# Patient Record
Sex: FEMALE | Race: WHITE | NOT HISPANIC OR LATINO | Employment: STUDENT | URBAN - METROPOLITAN AREA
[De-identification: names, ages, dates, MRNs, and addresses within clinical notes are randomized per-mention and may not be internally consistent; named-entity substitution may affect disease eponyms.]

---
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
## 2020-02-23 ENCOUNTER — OFFICE VISIT (OUTPATIENT)
Dept: URGENT CARE | Age: 20
End: 2020-02-23
Payer: COMMERCIAL

## 2020-02-23 VITALS
RESPIRATION RATE: 20 BRPM | BODY MASS INDEX: 19.42 KG/M2 | HEART RATE: 98 BPM | WEIGHT: 113.78 LBS | OXYGEN SATURATION: 100 % | HEIGHT: 64 IN | DIASTOLIC BLOOD PRESSURE: 81 MMHG | SYSTOLIC BLOOD PRESSURE: 121 MMHG | TEMPERATURE: 98.2 F

## 2020-02-23 DIAGNOSIS — J20.9 ACUTE BRONCHITIS, UNSPECIFIED ORGANISM: Primary | ICD-10-CM

## 2020-02-23 DIAGNOSIS — R07.89 CHEST TIGHTNESS: ICD-10-CM

## 2020-02-23 DIAGNOSIS — J02.9 SORE THROAT: ICD-10-CM

## 2020-02-23 LAB — S PYO AG THROAT QL: NEGATIVE

## 2020-02-23 PROCEDURE — 87147 CULTURE TYPE IMMUNOLOGIC: CPT | Performed by: PHYSICIAN ASSISTANT

## 2020-02-23 PROCEDURE — 94640 AIRWAY INHALATION TREATMENT: CPT | Performed by: PHYSICIAN ASSISTANT

## 2020-02-23 PROCEDURE — 99213 OFFICE O/P EST LOW 20 MIN: CPT | Performed by: PHYSICIAN ASSISTANT

## 2020-02-23 PROCEDURE — 87070 CULTURE OTHR SPECIMN AEROBIC: CPT | Performed by: PHYSICIAN ASSISTANT

## 2020-02-23 RX ORDER — ALBUTEROL SULFATE 90 UG/1
2 AEROSOL, METERED RESPIRATORY (INHALATION) EVERY 6 HOURS PRN
Qty: 18 G | Refills: 0 | Status: SHIPPED | OUTPATIENT
Start: 2020-02-23

## 2020-02-23 RX ORDER — ALBUTEROL SULFATE 2.5 MG/3ML
2.5 SOLUTION RESPIRATORY (INHALATION) ONCE
Status: COMPLETED | OUTPATIENT
Start: 2020-02-23 | End: 2020-02-23

## 2020-02-23 RX ORDER — FLUOXETINE 10 MG/1
CAPSULE ORAL
COMMUNITY
Start: 2020-01-30

## 2020-02-23 RX ORDER — METHYLPREDNISOLONE 4 MG/1
TABLET ORAL
Qty: 21 TABLET | Refills: 0 | Status: SHIPPED | OUTPATIENT
Start: 2020-02-23

## 2020-02-23 RX ORDER — DROSPIRENONE AND ETHINYL ESTRADIOL 0.02-3(28)
1 KIT ORAL DAILY
COMMUNITY
Start: 2020-01-23 | End: 2021-01-22

## 2020-02-23 RX ADMIN — ALBUTEROL SULFATE 2.5 MG: 2.5 SOLUTION RESPIRATORY (INHALATION) at 08:52

## 2020-02-23 NOTE — LETTER
February 23, 2020     Patient: Anuel Meade   YOB: 2000   Date of Visit: 2/23/2020       To Whom it May Concern:    Anuel Meade was seen in my clinic on 2/23/2020  She may return to work on 2/24/2020  If you have any questions or concerns, please don't hesitate to call           Sincerely,          Petra Gongora PA-C        CC: No Recipients

## 2020-02-23 NOTE — PROGRESS NOTES
[facility]      NAME: Helena Dawson is a 23 y o  female  : 2000    MRN: [id number]  DATE: 2020  TIME: 9:00 AM    Assessment and Plan   Acute bronchitis, unspecified organism [J20 9]  1  Acute bronchitis, unspecified organism  methylPREDNISolone 4 MG tablet therapy pack    albuterol (Ventolin HFA) 90 mcg/act inhaler   2  Sore throat  POCT rapid strepA    Throat culture   3  Chest tightness  albuterol inhalation solution 2 5 mg     rapid strep negative    Albuterol nebulized treatment administered in the office  Patient tolerated well  Patient has subjective and objective reduction in symptoms  O2 saturation 100% with a pulse of 101 upon discharge  Patient Instructions     Follow up with PCP in 3-5 days  Proceed to  ER if symptoms worsen  Patient placed on steroids, albuterol as needed  Continue with symptomatic treatment  Patient will begin flonase as needed for nasal congestion  Patient will begin mucinex as directed  Tylenol as needed for fever of chills   Warm salt gargles as needed for sore throat     Chief Complaint     Chief Complaint   Patient presents with    Cough     pt has a cough, sore throat and headache x3 days denies any fever or chills  History of Present Illness       Patient is 70-year-old female presenting the office for sore throat, chest congestion, cough  Patient states that symptoms ongoing past 3-4 days in duration  Patient states her symptoms began as a mild nonproductive cough and since progressed to nasal congestion, headache, sore throat, productive cough  Patient denies any fevers any chills  Patient denies any problem her eyes does admit to having nasal congestion and ear pressure  Patient states that her throat pain is symmetric in nature  Patient states that she does experience shortness of breath chest tightness associated with activity is currently short of breath    Patient states that this began soon after the initiation of her upper respiratory symptoms  Patient denies any prior history of shortness of breath     Patient states that her cough is mildly productive producing white/green sputum  Patient denies any nausea vomiting diarrhea or abdominal pain  Patient denies any chest pain or neck pain, numbness tingling the arms  Patient states she has been taking Tylenol minimal relief of symptoms  Patient offers no other complaints at this time  Cough   This is a new problem  The current episode started in the past 7 days  The problem has been gradually worsening  The problem occurs every few minutes  The cough is productive of sputum  Associated symptoms include ear congestion, nasal congestion, a sore throat and shortness of breath  Pertinent negatives include no chest pain, chills, ear pain, fever, headaches, heartburn, hemoptysis, myalgias, postnasal drip, rash, rhinorrhea, sweats, weight loss or wheezing  Treatments tried: tylenol  The treatment provided no relief  There is no history of asthma, bronchiectasis, bronchitis, COPD, emphysema, environmental allergies or pneumonia  Review of Systems   Review of Systems   Constitutional: Negative  Negative for chills, fever and weight loss  HENT: Positive for congestion and sore throat  Negative for dental problem, drooling, ear discharge, ear pain, facial swelling, hearing loss, mouth sores, nosebleeds, postnasal drip, rhinorrhea, sinus pressure, sinus pain, sneezing, tinnitus, trouble swallowing and voice change  Eyes: Negative  Respiratory: Positive for cough and shortness of breath  Negative for apnea, hemoptysis, choking, chest tightness, wheezing and stridor  Cardiovascular: Negative  Negative for chest pain  Gastrointestinal: Negative  Negative for heartburn  Endocrine: Negative  Genitourinary: Negative  Musculoskeletal: Negative  Negative for myalgias  Skin: Negative  Negative for rash  Allergic/Immunologic: Negative    Negative for environmental allergies  Neurological: Negative  Negative for headaches  Hematological: Negative  Psychiatric/Behavioral: Negative  Current Medications       Current Outpatient Medications:     drospirenone-ethinyl estradiol (JACKIE) 3-0 02 MG per tablet, Take 1 tablet by mouth daily, Disp: , Rfl:     FLUoxetine (PROzac) 10 mg capsule, 1 tab po qday for 2 week, please advance to 2 tab daily afterward , Disp: , Rfl:     albuterol (Ventolin HFA) 90 mcg/act inhaler, Inhale 2 puffs every 6 (six) hours as needed for wheezing or shortness of breath, Disp: 18 g, Rfl: 0    methylPREDNISolone 4 MG tablet therapy pack, Use as directed on package, Disp: 21 tablet, Rfl: 0    norgestimate-ethinyl estradiol (ORTHO TRI-CYCLEN,TRINESSA) 0 18/0 215/0 25 MG-35 MCG per tablet, Take 1 tablet by mouth daily, Disp: , Rfl:     TRI FEMYNOR 0 18/0 215/0 25 MG-35 MCG per tablet, Take 1 tablet by mouth daily, Disp: , Rfl: 4  No current facility-administered medications for this visit  Current Allergies     Allergies as of 02/23/2020    (No Known Allergies)            The following portions of the patient's history were reviewed and updated as appropriate: allergies, current medications, past family history, past medical history, past social history, past surgical history and problem list      Past Medical History:   Diagnosis Date    Anxiety     Depression        Past Surgical History:   Procedure Laterality Date    MOUTH SURGERY         Family History   Problem Relation Age of Onset    No Known Problems Mother     No Known Problems Father          Medications have been verified  Objective   /81   Pulse 98   Temp 98 2 °F (36 8 °C) (Tympanic)   Resp 20   Ht 5' 4" (1 626 m)   Wt 51 6 kg (113 lb 12 5 oz)   SpO2 100%   BMI 19 53 kg/m²        Physical Exam     Physical Exam   Constitutional: She is oriented to person, place, and time  She appears well-developed and well-nourished     HENT:   Head: Normocephalic  Right Ear: Hearing, tympanic membrane, external ear and ear canal normal    Left Ear: Hearing, tympanic membrane, external ear and ear canal normal    Nose: Nose normal    Mouth/Throat: Uvula is midline and mucous membranes are normal  No oropharyngeal exudate, posterior oropharyngeal edema, posterior oropharyngeal erythema or tonsillar abscesses  Tonsils are 1+ on the right  Tonsils are 1+ on the left  No tonsillar exudate  Eyes: Pupils are equal, round, and reactive to light  Conjunctivae and EOM are normal    Neck: Normal range of motion  Cardiovascular: Normal rate, regular rhythm, normal heart sounds and intact distal pulses  Pulmonary/Chest: Effort normal and breath sounds normal    Abdominal: Soft  Bowel sounds are normal    Neurological: She is alert and oriented to person, place, and time  Skin: Skin is warm  Capillary refill takes less than 2 seconds  Psychiatric: She has a normal mood and affect  Her behavior is normal  Judgment and thought content normal    Nursing note and vitals reviewed

## 2020-02-23 NOTE — PATIENT INSTRUCTIONS
Follow up with PCP in 3-5 days  Proceed to  ER if symptoms worsen  Patient placed on steroids, albuterol as needed  Continue with symptomatic treatment  Patient will begin flonase as needed for nasal congestion  Patient will begin mucinex as directed  Tylenol as needed for fever of chills   Warm salt gargles as needed for sore throat   Discussed possibility of alternative therapy in the future symptoms continue  Acute Bronchitis   WHAT YOU NEED TO KNOW:   Acute bronchitis is swelling and irritation in the air passages of your lungs  This irritation may cause you to cough or have other breathing problems  Acute bronchitis often starts because of another illness, such as a cold or the flu  The illness spreads from your nose and throat to your windpipe and airways  Bronchitis is often called a chest cold  Acute bronchitis lasts about 3 to 6 weeks and is usually not a serious illness  Your cough can last for several weeks  DISCHARGE INSTRUCTIONS:   Return to the emergency department if:   · You cough up blood  · Your lips or fingernails turn blue  · You feel like you are not getting enough air when you breathe  Contact your healthcare provider if:   · You have a fever  · Your breathing problems do not go away or get worse  · Your cough does not get better within 4 weeks  · You have questions or concerns about your condition or care  Self-care:   · Get more rest   Rest helps your body to heal  Slowly start to do more each day  Rest when you feel it is needed  · Avoid irritants in the air  Avoid chemicals, fumes, and dust  Wear a face mask if you must work around dust or fumes  Stay inside on days when air pollution levels are high  If you have allergies, stay inside when pollen counts are high  Do not use aerosol products, such as spray-on deodorant, bug spray, and hair spray  · Do not smoke or be around others who smoke    Nicotine and other chemicals in cigarettes and cigars damages the cilia that move mucus out of your lungs  Ask your healthcare provider for information if you currently smoke and need help to quit  E-cigarettes or smokeless tobacco still contain nicotine  Talk to your healthcare provider before you use these products  · Drink liquids as directed  Liquids help keep your air passages moist and help you cough up mucus  You may need to drink more liquids when you have acute bronchitis  Ask how much liquid to drink each day and which liquids are best for you  · Use a humidifier or vaporizer  Use a cool mist humidifier or a vaporizer to increase air moisture in your home  This may make it easier for you to breathe and help decrease your cough  Decrease risk for acute bronchitis:   · Get the vaccinations you need  Ask your healthcare provider if you should get vaccinated against the flu or pneumonia  · Prevent the spread of germs  You can decrease your risk of acute bronchitis and other illnesses by doing the following:     [facility] your hands often with soap and water  Carry germ-killing hand lotion or gel with you  You can use the lotion or gel to clean your hands when soap and water are not available  ¨ Do not touch your eyes, nose, or mouth unless you have washed your hands first     ¨ Always cover your mouth when you cough to prevent the spread of germs  It is best to cough into a tissue or your shirt sleeve instead of into your hand  Ask those around you cover their mouths when they cough  ¨ Try to avoid people who have a cold or the flu  If you are sick, stay away from others as much as possible  Medicines: Your healthcare provider may  give you any of the following:  · Ibuprofen or acetaminophen  are medicines that help lower your fever  They are available without a doctor's order  Ask your healthcare provider which medicine is right for you  Ask how much to take and how often to take it  Follow directions   These medicines can cause stomach bleeding if not taken correctly  Ibuprofen can cause kidney damage  Do not take ibuprofen if you have kidney disease, an ulcer, or allergies to aspirin  Acetaminophen can cause liver damage  Do not take more than 4,000 milligrams in 24 hours  · Decongestants  help loosen mucus in your lungs and make it easier to cough up  This can help you breathe easier  · Cough suppressants  decrease your urge to cough  If your cough produces mucus, do not take a cough suppressant unless your healthcare provider tells you to  Your healthcare provider may suggest that you take a cough suppressant at night so you can rest     · Inhalers  may be given  Your healthcare provider may give you one or more inhalers to help you breathe easier and cough less  An inhaler gives your medicine to open your airways  Ask your healthcare provider to show you how to use your inhaler correctly  · Take your medicine as directed  Contact your healthcare provider if you think your medicine is not helping or if you have side effects  Tell him of her if you are allergic to any medicine  Keep a list of the medicines, vitamins, and herbs you take  Include the amounts, and when and why you take them  Bring the list or the pill bottles to follow-up visits  Carry your medicine list with you in case of an emergency  Follow up with your healthcare provider as directed:  Write down questions you have so you will remember to ask them during your follow-up visits  © 2017 [employer] Information is for End User's use only and may not be sold, redistributed or otherwise used for commercial purposes  All illustrations and images included in CareNotes® are the copyrighted property of [employer]  or [employer]  The above information is an  only  It is not intended as medical advice for individual conditions or treatments   Talk to your doctor, nurse or pharmacist before following any medical regimen to see if it is safe and effective for you

## 2020-02-27 ENCOUNTER — TELEPHONE (OUTPATIENT)
Dept: URGENT CARE | Age: 20
End: 2020-02-27

## 2020-02-27 LAB — BACTERIA THROAT CULT: ABNORMAL

## 2020-02-27 NOTE — TELEPHONE ENCOUNTER
Left message for patient  Patient tested positive for strep  Will place patient on antibiotics at this time

## 2020-02-28 ENCOUNTER — TELEPHONE (OUTPATIENT)
Dept: URGENT CARE | Age: 20
End: 2020-02-28

## 2020-02-28 NOTE — TELEPHONE ENCOUNTER
Spoke and left message for patient's mother   Advised patient to return phone call  Patient tested positive for strep    Will place on antibiotics pending symptoms

## 2020-03-01 ENCOUNTER — TELEPHONE (OUTPATIENT)
Dept: URGENT CARE | Age: 20
End: 2020-03-01

## 2020-03-01 NOTE — TELEPHONE ENCOUNTER
Left message for patient with phone number given  Patient tested positive for strep   Will evaluate patient's symptoms and possibly prescribe ABX pending symptom resolution

## 2020-03-05 ENCOUNTER — TELEPHONE (OUTPATIENT)
Dept: URGENT CARE | Age: 20
End: 2020-03-05

## 2020-03-05 NOTE — TELEPHONE ENCOUNTER
Spoke with patient  Patient tested positive for strep  Patient states that she is currently asymptomatic  Will not place patient on antibiotics

## 2020-03-05 NOTE — TELEPHONE ENCOUNTER
Called left message for patient  Patient tested positive for strep  Will possibly place patient on antibiotics pending symptoms

## 2022-10-25 ENCOUNTER — OFFICE VISIT (OUTPATIENT)
Dept: URGENT CARE | Age: 22
End: 2022-10-25
Payer: COMMERCIAL

## 2022-10-25 VITALS
OXYGEN SATURATION: 99 % | HEART RATE: 74 BPM | TEMPERATURE: 98.2 F | SYSTOLIC BLOOD PRESSURE: 106 MMHG | RESPIRATION RATE: 18 BRPM | DIASTOLIC BLOOD PRESSURE: 68 MMHG

## 2022-10-25 DIAGNOSIS — J02.9 ACUTE PHARYNGITIS, UNSPECIFIED ETIOLOGY: Primary | ICD-10-CM

## 2022-10-25 LAB — S PYO AG THROAT QL: NEGATIVE

## 2022-10-25 PROCEDURE — G0382 LEV 3 HOSP TYPE B ED VISIT: HCPCS | Performed by: PHYSICIAN ASSISTANT

## 2022-10-25 PROCEDURE — 87880 STREP A ASSAY W/OPTIC: CPT | Performed by: PHYSICIAN ASSISTANT

## 2022-10-25 PROCEDURE — 87070 CULTURE OTHR SPECIMN AEROBIC: CPT | Performed by: PHYSICIAN ASSISTANT

## 2022-10-25 RX ORDER — AMOXICILLIN 500 MG/1
500 CAPSULE ORAL EVERY 8 HOURS SCHEDULED
Qty: 30 CAPSULE | Refills: 0 | Status: SHIPPED | OUTPATIENT
Start: 2022-10-25 | End: 2022-11-04

## 2022-10-25 NOTE — PROGRESS NOTES
[facility]        NAME: Harlan Guaman is a 25 y o  female  : 2000    MRN: [id number]  DATE: 2022  TIME: 10:45 AM    Assessment and Plan   Acute pharyngitis, unspecified etiology [J02 9]  1  Acute pharyngitis, unspecified etiology  amoxicillin (AMOXIL) 500 mg capsule    POCT rapid strepA    Throat culture   Pt presents with complaints of sore throat and examination concerning for strep  Rapid strep in the clinic is negative; however, history and exam are consistent with strep pharyngitis    Pt will be started on Amoxicillin to treat as this is the treatment of choice for strep  Pt allergies were reviewed and they have no allergy to penicillins  Pt is instructed that they are considered contagious until they have been on antibiotics for 24 hours  They should not attend work or school during that time  We discussed that after they have been on antibiotics 48 hours they need to throw away their current toothbrush and replace with a new toothbrush to prevent re-colonizing themselves with strep bacteria  The patient will follow-up with their PCP in 2-3 days if symptoms are not improved on antibiotics  They will report to the emergency room if symptoms worsen or new symptoms such as jaw pain, difficulty swallowing, anterior neck pain, or hoarseness of voice develop  Patient Instructions     Patient Instructions   • Take antibiotics as prescribed  Make sure to take all the antibiotic even after you start feeling better  If you stop them too soon, you risk developing a resistant infection that is more difficult and expensive to treat  Never save antibiotics or take antibiotics without the recommendation of a healthcare provider or dental professional  Note that if you are taking birth control medications, antibiotics can decrease their effectiveness  Recommend abstinence or back up method while on antibiotics and for 3-5 days after completing the antibiotic course     • You are considered contagious until you have been on the antibiotic for 24 hours  You should not share food or drinks with others and should not kiss on the mouth in that time  You should also stay home from work or school to avoid spreading the infection to others  • You need to switch to a brand new, never used toothbrush after 48 hours (or 2 days on the antibiotic) to prevent giving strep back to yourself again  • If symptoms are not improved after 2-3 days on the antibiotics, follow-up with your primary care provider  • If symptoms worsen or new symptoms such as drooling, difficulty swallowing, voice changes, anterior neck pain, or jaw pain develop report to the emergency room as these are symptoms of an abscess having formed in your throat or neck  COVID Info  If you have a positive result you need to quarantine at home for a minimum of 5 days from the date your symptoms started  You may end your quarantine when you are symptoms free without medications to reduce fever (e g  acetaminophen/Tylenol) for 24 hours  Anyone whom you have been in close regular contact (unmasked > 15 minute intervals) since you began having symptoms should be tested within 5-7 days of your positive test regardless of vaccination status or symptoms  Masks should be worn at all times whenever indoors until 10 days after your exposure or positive result  Recommend over the counter antihistamines such as Claratin, Allegra, Zyrtec, or Benadryl for congestion  You may also use over the counter nasal sprays such as Flonase for this  Over the counter lozenges such as Cepacol, Ricola, Halls, or Chloroseptic can be used for sore throat symptoms  Recommend Vitamin C, Vitamin D3, multivitamin and Zinc for immune support  Discuss dosing recommendations with pharmacist especially in children and infants  If your symptoms worsen or you develop shortness of breath report to the nearest emergency room     Check [facility] for the most up to date information as we continue to learn more about the virus  Follow up with PCP in 3-5 days  Proceed to  ER if symptoms worsen  Chief Complaint     Chief Complaint   Patient presents with   • Sore Throat     sORE THROAT FOR PASTT WO DAYS  lONG HX OF STREP         History of Present Illness       25year old female presents with complaints of sore throat and mild congestion for 2 days duration  Pt denies fever, chills, sore throat, cough, headache, shortness of breath, chest pain, nausea, vomiting, diarrhea, fatigue, myalgias, and loss of taste and smell  Pt denies known COVID exposures, but reports that she has had multiple strep exposures  She reports seasonal allergies and reports her congestion is similar to that  No other concerns or complaints today  Review of Systems   Review of Systems   Constitutional: Negative for chills, fatigue and fever  HENT: Positive for congestion and sore throat  Negative for postnasal drip, rhinorrhea, trouble swallowing and voice change  Respiratory: Negative for cough and shortness of breath  Cardiovascular: Negative for chest pain  Gastrointestinal: Negative for diarrhea, nausea and vomiting  Musculoskeletal: Negative for myalgias  Neurological: Negative for headaches           Current Medications       Current Outpatient Medications:   •  amoxicillin (AMOXIL) 500 mg capsule, Take 1 capsule (500 mg total) by mouth every 8 (eight) hours for 10 days, Disp: 30 capsule, Rfl: 0  •  norgestimate-ethinyl estradiol (ORTHO TRI-CYCLEN,TRINESSA) 0 18/0 215/0 25 MG-35 MCG per tablet, Take 1 tablet by mouth daily, Disp: , Rfl:   •  TRI FEMYNOR 0 18/0 215/0 25 MG-35 MCG per tablet, Take 1 tablet by mouth daily, Disp: , Rfl: 4  •  albuterol (Ventolin HFA) 90 mcg/act inhaler, Inhale 2 puffs every 6 (six) hours as needed for wheezing or shortness of breath (Patient not taking: Reported on 10/25/2022), Disp: 18 g, Rfl: 0  •  drospirenone-ethinyl estradiol (JACKIE) 3-0 02 MG per tablet, Take 1 tablet by mouth daily, Disp: , Rfl:   •  FLUoxetine (PROzac) 10 mg capsule, 1 tab po qday for 2 week, please advance to 2 tab daily afterward  (Patient not taking: Reported on 10/25/2022), Disp: , Rfl:   •  methylPREDNISolone 4 MG tablet therapy pack, Use as directed on package (Patient not taking: Reported on 10/25/2022), Disp: 21 tablet, Rfl: 0    Current Allergies     Allergies as of 10/25/2022   • (No Known Allergies)            The following portions of the patient's history were reviewed and updated as appropriate: allergies, current medications, past family history, past medical history, past social history, past surgical history and problem list      Past Medical History:   Diagnosis Date   • Anxiety    • Depression        Past Surgical History:   Procedure Laterality Date   • MOUTH SURGERY         Family History   Problem Relation Age of Onset   • No Known Problems Mother    • No Known Problems Father          Medications have been verified  Objective   /68   Pulse 74   Temp 98 2 °F (36 8 °C)   Resp 18   SpO2 99%   No LMP recorded  Physical Exam     Physical Exam  Vitals and nursing note reviewed  Constitutional:       General: She is not in acute distress  Appearance: Normal appearance  She is not ill-appearing or toxic-appearing  HENT:      Head: Normocephalic and atraumatic  Jaw: No trismus  Right Ear: Tympanic membrane, ear canal and external ear normal  There is no impacted cerumen  No foreign body  Left Ear: Tympanic membrane, ear canal and external ear normal  There is no impacted cerumen  No foreign body  Nose: No nasal deformity, mucosal edema, congestion or rhinorrhea  Right Nostril: No foreign body, epistaxis or occlusion  Left Nostril: No foreign body, epistaxis or occlusion  Right Turbinates: Not enlarged, swollen or pale        Left Turbinates: Not enlarged, swollen or pale  Mouth/Throat:      Lips: Pink  No lesions  Mouth: Mucous membranes are moist  No injury, oral lesions or angioedema  Dentition: Normal dentition  Tongue: No lesions  Tongue does not deviate from midline  Palate: No mass and lesions  Pharynx: Uvula midline  Pharyngeal swelling and posterior oropharyngeal erythema present  No oropharyngeal exudate or uvula swelling  Tonsils: No tonsillar exudate or tonsillar abscesses  3+ on the right  3+ on the left  Eyes:      General: Lids are normal  Gaze aligned appropriately  No allergic shiner  Extraocular Movements: Extraocular movements intact  Cardiovascular:      Rate and Rhythm: Normal rate and regular rhythm  Pulmonary:      Effort: Pulmonary effort is normal       Comments: Patient speaking in full sentences with no increased respiratory effort  No audible wheezing or stridor  Lymphadenopathy:      Cervical: Cervical adenopathy present  Right cervical: Superficial cervical adenopathy present  No deep or posterior cervical adenopathy  Left cervical: Superficial cervical adenopathy present  No deep or posterior cervical adenopathy  Skin:     General: Skin is warm and dry  Neurological:      Mental Status: She is alert and oriented to person, place, and time  Coordination: Coordination is intact  Gait: Gait is intact  Psychiatric:         Attention and Perception: Attention and perception normal          Mood and Affect: Mood and affect normal          Speech: Speech normal          Behavior: Behavior is cooperative  Note: Portions of this record may have been created with voice recognition software  Occasional wrong word or "sound a like" substitutions may have occurred due to the inherent limitations of voice recognition software  Please read the chart carefully and recognize, using context, where substitutions have occurred  *

## 2022-10-25 NOTE — PATIENT INSTRUCTIONS
Take antibiotics as prescribed  Make sure to take all the antibiotic even after you start feeling better  If you stop them too soon, you risk developing a resistant infection that is more difficult and expensive to treat  Never save antibiotics or take antibiotics without the recommendation of a healthcare provider or dental professional  Note that if you are taking birth control medications, antibiotics can decrease their effectiveness  Recommend abstinence or back up method while on antibiotics and for 3-5 days after completing the antibiotic course  You are considered contagious until you have been on the antibiotic for 24 hours  You should not share food or drinks with others and should not kiss on the mouth in that time  You should also stay home from work or school to avoid spreading the infection to others  You need to switch to a brand new, never used toothbrush after 48 hours (or 2 days on the antibiotic) to prevent giving strep back to yourself again  If symptoms are not improved after 2-3 days on the antibiotics, follow-up with your primary care provider  If symptoms worsen or new symptoms such as drooling, difficulty swallowing, voice changes, anterior neck pain, or jaw pain develop report to the emergency room as these are symptoms of an abscess having formed in your throat or neck  COVID Info  If you have a positive result you need to quarantine at home for a minimum of 5 days from the date your symptoms started  You may end your quarantine when you are symptoms free without medications to reduce fever (e g  acetaminophen/Tylenol) for 24 hours  Anyone whom you have been in close regular contact (unmasked > 15 minute intervals) since you began having symptoms should be tested within 5-7 days of your positive test regardless of vaccination status or symptoms  Masks should be worn at all times whenever indoors until 10 days after your exposure or positive result     Recommend over the counter antihistamines such as Claratin, Allegra, Zyrtec, or Benadryl for congestion  You may also use over the counter nasal sprays such as Flonase for this  Over the counter lozenges such as Cepacol, Ricola, Halls, or Chloroseptic can be used for sore throat symptoms  Recommend Vitamin C, Vitamin D3, multivitamin and Zinc for immune support  Discuss dosing recommendations with pharmacist especially in children and infants  If your symptoms worsen or you develop shortness of breath report to the nearest emergency room  Check [facility] for the most up to date information as we continue to learn more about the virus

## 2022-10-27 LAB — BACTERIA THROAT CULT: NORMAL
